# Patient Record
Sex: FEMALE | Race: WHITE | HISPANIC OR LATINO | Employment: OTHER | URBAN - METROPOLITAN AREA
[De-identification: names, ages, dates, MRNs, and addresses within clinical notes are randomized per-mention and may not be internally consistent; named-entity substitution may affect disease eponyms.]

---

## 2023-07-30 ENCOUNTER — OFFICE VISIT (OUTPATIENT)
Dept: URGENT CARE | Facility: URGENT CARE | Age: 83
End: 2023-07-30
Payer: MEDICARE

## 2023-07-30 VITALS
DIASTOLIC BLOOD PRESSURE: 80 MMHG | OXYGEN SATURATION: 99 % | WEIGHT: 150 LBS | SYSTOLIC BLOOD PRESSURE: 132 MMHG | TEMPERATURE: 97.8 F | HEART RATE: 76 BPM

## 2023-07-30 DIAGNOSIS — J36 PERITONSILLAR CELLULITIS: Primary | ICD-10-CM

## 2023-07-30 PROCEDURE — 99203 OFFICE O/P NEW LOW 30 MIN: CPT | Performed by: INTERNAL MEDICINE

## 2023-07-30 RX ORDER — AMOXICILLIN 875 MG
875 TABLET ORAL 2 TIMES DAILY
Qty: 14 TABLET | Refills: 0 | Status: SHIPPED | OUTPATIENT
Start: 2023-07-30 | End: 2023-08-06

## 2023-07-30 RX ORDER — FLUTICASONE PROPIONATE 50 MCG
1 SPRAY, SUSPENSION (ML) NASAL DAILY
COMMUNITY
Start: 2023-06-21

## 2023-07-30 RX ORDER — LEVOTHYROXINE SODIUM 150 UG/1
150 TABLET ORAL DAILY
COMMUNITY

## 2023-07-30 RX ORDER — ALBUTEROL SULFATE 90 UG/1
1 AEROSOL, METERED RESPIRATORY (INHALATION) EVERY 6 HOURS PRN
COMMUNITY
Start: 2023-06-21

## 2023-07-30 NOTE — PROGRESS NOTES
"  Assessment & Plan     Peritonsillar cellulitis  See patient instructions.  - amoxicillin (AMOXIL) 875 MG tablet; Take 1 tablet (875 mg) by mouth 2 times daily for 7 days    Prieto Lentz MD  [facility] [place]    Danyel Fuller is a 83 year old, presenting for the following health issues:  Urgent Care and Ear Problem (C/O ear pain for 1 day)      HPI   Chief complaint of pain in the neck on the left side that has been worsening over the past several weeks.  Uses CPAP at night.  The pain is now radiating up into the ear on the left.  Denies odynophagia.  Denies pain with head movement; some pain with jaw opening. Denies fevers/chills/sweats.  Has some annoying and intermittent \"tickling\" cough.  Uses fluticasone nasal spray a few times per week and albuterol PRN. Carries a diagnosis of pulmonary fibrosis.    Review of Systems   Constitutional, HEENT, cardiovascular, pulmonary, gi and gu systems are negative, except as otherwise noted.      Objective    /80   Pulse 76   Temp 97.8  F (36.6  C) (Tympanic)   Wt 68 kg (150 lb)   SpO2 99%   There is no height or weight on file to calculate BMI.  Physical Exam   GENERAL APPEARANCE: healthy, alert, and no distress  HENT: ear canals and TM's normal and nose and mouth without ulcers or lesions  NECK: tender adenopathy of the neck on the left in the anterior cervical triangle with pain on jaw opening  RESP: dry crackles at both lung bases consistent with known diagnosis of pulmonary fibrosis  CV: regular rates and rhythm, normal S1 S2, no S3 or S4, and no murmur, click or rub                "

## 2023-07-30 NOTE — PATIENT INSTRUCTIONS
"Use the fluticasone daily.  This is safe for you (won't have the issue of your nose becoming \"addicted\").  It will help to reduce chronic inflammatory irritation that can be related to the CPAP.    "

## 2023-09-09 ENCOUNTER — HEALTH MAINTENANCE LETTER (OUTPATIENT)
Age: 83
End: 2023-09-09

## 2024-11-02 ENCOUNTER — HEALTH MAINTENANCE LETTER (OUTPATIENT)
Age: 84
End: 2024-11-02

## 2024-11-15 ENCOUNTER — TELEPHONE (OUTPATIENT)
Dept: FAMILY MEDICINE | Facility: CLINIC | Age: 84
End: 2024-11-15

## 2024-11-15 ENCOUNTER — ANCILLARY PROCEDURE (OUTPATIENT)
Dept: GENERAL RADIOLOGY | Facility: CLINIC | Age: 84
End: 2024-11-15
Attending: PHYSICIAN ASSISTANT
Payer: MEDICARE

## 2024-11-15 ENCOUNTER — APPOINTMENT (OUTPATIENT)
Dept: GENERAL RADIOLOGY | Facility: CLINIC | Age: 84
End: 2024-11-15
Payer: MEDICARE

## 2024-11-15 ENCOUNTER — OFFICE VISIT (OUTPATIENT)
Dept: URGENT CARE | Facility: URGENT CARE | Age: 84
End: 2024-11-15
Payer: MEDICARE

## 2024-11-15 VITALS
HEART RATE: 76 BPM | TEMPERATURE: 97.6 F | DIASTOLIC BLOOD PRESSURE: 74 MMHG | OXYGEN SATURATION: 96 % | RESPIRATION RATE: 14 BRPM | SYSTOLIC BLOOD PRESSURE: 150 MMHG

## 2024-11-15 DIAGNOSIS — R05.8 PRODUCTIVE COUGH: Primary | ICD-10-CM

## 2024-11-15 DIAGNOSIS — H66.002 ACUTE SUPPURATIVE OTITIS MEDIA OF LEFT EAR WITHOUT SPONTANEOUS RUPTURE OF TYMPANIC MEMBRANE, RECURRENCE NOT SPECIFIED: ICD-10-CM

## 2024-11-15 DIAGNOSIS — R05.8 PRODUCTIVE COUGH: ICD-10-CM

## 2024-11-15 PROCEDURE — 71046 X-RAY EXAM CHEST 2 VIEWS: CPT | Mod: TC | Performed by: STUDENT IN AN ORGANIZED HEALTH CARE EDUCATION/TRAINING PROGRAM

## 2024-11-15 PROCEDURE — 99203 OFFICE O/P NEW LOW 30 MIN: CPT | Performed by: PHYSICIAN ASSISTANT

## 2024-11-15 RX ORDER — PANTOPRAZOLE SODIUM 40 MG/1
1 FOR SUSPENSION ORAL DAILY
COMMUNITY

## 2024-11-15 RX ORDER — LATANOPROST 50 UG/ML
1 SOLUTION/ DROPS OPHTHALMIC DAILY
COMMUNITY

## 2024-11-15 RX ORDER — LACTULOSE 10 G/15ML
20 SOLUTION ORAL 3 TIMES DAILY
COMMUNITY

## 2024-11-15 RX ORDER — FAMOTIDINE 20 MG/1
20 TABLET, FILM COATED ORAL 2 TIMES DAILY
COMMUNITY

## 2024-11-15 ASSESSMENT — ENCOUNTER SYMPTOMS
RHINORRHEA: 1
COUGH: 1

## 2024-11-15 NOTE — PATIENT INSTRUCTIONS
For productive cough I suggest using over the counter medications such as guaifenesin (Mucinex). Mucinex will reduce the viscosity of mucus secretions and help with productive cough. Drink plenty of fluids while on Mucinex.   You can also take the delsym to suppress the cough. Follow up in clinic if symptoms persist or worsen.     Ears   Expect to see improvement of symptoms in 2-3 after starting antibiotics. Follow up in the clinic or ED if symptoms worsen. Complete resolution of symptoms expected after 7-10 days    Xray  I ordered the x ray for the chest. Come back to the clinic and report to the lab to get that done. We will call with results.

## 2024-11-15 NOTE — TELEPHONE ENCOUNTER
Test Results    Contacts       Contact Date/Time Type Contact Phone/Fax    11/15/2024 03:38 PM CST Phone (Incoming) Alina Bland (Self) [phone] (M)            Who ordered the test:  Dr. Genao    Type of test: X-ray    Date of test:  11/15    Where was the test performed:  [facility]    What are your questions/concerns?:  Patient would like a call back about the results of her xray. She would prefer a call instead of [employer] messages please.    Could we send this information to you in [email or web address] or would you prefer to receive a phone call?:   Patient would prefer a phone call   Okay to leave a detailed message?: Yes at Cell number on file:    Telephone Information:   Mobile [phone]

## 2024-11-15 NOTE — PROGRESS NOTES
Assessment & Plan        1. Acute suppurative otitis media of left ear without spontaneous rupture of tympanic membrane, recurrence not specified    - amoxicillin-clavulanate (AUGMENTIN) 875-125 MG tablet; Take 1 tablet by mouth 2 times daily for 10 days.  Dispense: 20 tablet; Refill: 0    2. Productive cough (Primary)    -No evidence of pneumonia on the chest x-ray per radiology report  -X-ray results reported to patient  - XR Chest 2 Views; Future    Results for orders placed or performed in visit on 11/15/24   XR Chest 2 Views     Status: None    Narrative    CHEST TWO VIEWS 11/15/2024 2:22 PM     HISTORY: Productive cough for one week. Productive cough.    COMPARISON: None.       Impression    IMPRESSION: Bilateral diffuse interstitial change is probably chronic.  Minimal left basilar atelectasis/scar. No focal consolidation, pleural  effusion or pneumothorax. Heart size is normal. Surgical clips project  over the mid abdomen.     TRINY BADILLO MD         SYSTEM ID:  [id number]         Patient Instructions   For productive cough I suggest using over the counter medications such as guaifenesin (Mucinex). Mucinex will reduce the viscosity of mucus secretions and help with productive cough. Drink plenty of fluids while on Mucinex.   You can also take the delsym to suppress the cough. Follow up in clinic if symptoms persist or worsen.     Ears   Expect to see improvement of symptoms in 2-3 after starting antibiotics. Follow up in the clinic or ED if symptoms worsen. Complete resolution of symptoms expected after 7-10 days    Xray  I ordered the x ray for the chest. Come back to the clinic and report to the lab to get that done. We will call with results.       Return if symptoms worsen or fail to improve, for Follow up.    At the end of the encounter, I discussed results, diagnosis, medications. Discussed red flags for immediate return to clinic/ER, as well as indications for follow up if no improvement.  Patient understood and agreed to plan. Patient was stable for discharge.    Danyel Fuller is a 84 year old female who presents to clinic today with daughter in law for the following health issues:  Chief Complaint   Patient presents with    Urgent Care    Sinus Problem     Patient presents with sinus pressure, ears plugged and chest congestion for 1x week.      HPI    Patient presents to clinic with daughter-in-law.  Patient reports congestion, runny nose, postnasal drip, productive cough and ear pain.  She feels there is something in her lungs.  Has a history of pulmonary fibrosis, sees a Pulmonologist.  She is on albuterol as needed.  She has been taking Delsym at night to help suppress the cough which helps.  She denies fever, chills, shortness of breath.    Review of Systems   HENT:  Positive for congestion, ear pain and rhinorrhea.    Respiratory:  Positive for cough.        Problem List:  There are no relevant problems documented for this patient.      No past medical history on file.    Social History     Tobacco Use    Smoking status: Never     Passive exposure: Never    Smokeless tobacco: Never   Substance Use Topics    Alcohol use: Not on file           Objective    BP (!) 150/74 (BP Location: Right arm)   Pulse 76   Temp 97.6  F (36.4  C) (Temporal)   Resp 14   SpO2 96%   Physical Exam  Constitutional:       Appearance: Normal appearance.   HENT:      Head: Normocephalic.      Right Ear: A middle ear effusion is present. Tympanic membrane is erythematous.      Left Ear: Tympanic membrane normal.      Nose: Congestion present.      Right Sinus: No maxillary sinus tenderness or frontal sinus tenderness.      Left Sinus: No maxillary sinus tenderness or frontal sinus tenderness.      Mouth/Throat:      Mouth: Mucous membranes are moist.      Pharynx: Oropharynx is clear. Uvula midline. No posterior oropharyngeal erythema.   Cardiovascular:      Rate and Rhythm: Normal rate and regular rhythm.    Pulmonary:      Effort: Pulmonary effort is normal.      Breath sounds: Normal breath sounds.   Lymphadenopathy:      Head:      Right side of head: No submental, submandibular or tonsillar adenopathy.      Left side of head: No submental, submandibular or tonsillar adenopathy.      Cervical: No cervical adenopathy.      Right cervical: No superficial cervical adenopathy.     Left cervical: No superficial cervical adenopathy.   Skin:     General: Skin is warm and dry.      Findings: No rash.   Neurological:      Mental Status: She is alert.   Psychiatric:         Mood and Affect: Mood normal.         Behavior: Behavior normal.              Cris Genao PA-C